# Patient Record
Sex: FEMALE
[De-identification: names, ages, dates, MRNs, and addresses within clinical notes are randomized per-mention and may not be internally consistent; named-entity substitution may affect disease eponyms.]

---

## 2018-10-25 ENCOUNTER — HOSPITAL ENCOUNTER (EMERGENCY)
Dept: HOSPITAL 14 - H.ER | Age: 48
Discharge: HOME | End: 2018-10-25
Payer: COMMERCIAL

## 2018-10-25 VITALS
TEMPERATURE: 97.8 F | DIASTOLIC BLOOD PRESSURE: 78 MMHG | SYSTOLIC BLOOD PRESSURE: 120 MMHG | OXYGEN SATURATION: 98 % | HEART RATE: 77 BPM | RESPIRATION RATE: 16 BRPM

## 2018-10-25 DIAGNOSIS — F41.9: ICD-10-CM

## 2018-10-25 DIAGNOSIS — R00.2: Primary | ICD-10-CM

## 2018-10-25 DIAGNOSIS — Z88.0: ICD-10-CM

## 2018-10-25 LAB
ALBUMIN SERPL-MCNC: 4.3 G/DL (ref 3.5–5)
ALBUMIN/GLOB SERPL: 1.1 {RATIO} (ref 1–2.1)
ALT SERPL-CCNC: 36 U/L (ref 9–52)
AST SERPL-CCNC: 44 U/L (ref 14–36)
BASOPHILS # BLD AUTO: 0.1 K/UL (ref 0–0.2)
BASOPHILS NFR BLD: 1.1 % (ref 0–2)
BUN SERPL-MCNC: 22 MG/DL (ref 7–17)
CALCIUM SERPL-MCNC: 9.6 MG/DL (ref 8.4–10.2)
EOSINOPHIL # BLD AUTO: 0.2 K/UL (ref 0–0.7)
EOSINOPHIL NFR BLD: 2.3 % (ref 0–4)
ERYTHROCYTE [DISTWIDTH] IN BLOOD BY AUTOMATED COUNT: 12.8 % (ref 11.5–14.5)
GFR NON-AFRICAN AMERICAN: > 60
HGB BLD-MCNC: 14.6 G/DL (ref 12–16)
LYMPHOCYTES # BLD AUTO: 2.5 K/UL (ref 1–4.3)
LYMPHOCYTES NFR BLD AUTO: 37.6 % (ref 20–40)
MCH RBC QN AUTO: 32.5 PG (ref 27–31)
MCHC RBC AUTO-ENTMCNC: 34.1 G/DL (ref 33–37)
MCV RBC AUTO: 95.1 FL (ref 81–99)
MONOCYTES # BLD: 0.7 K/UL (ref 0–0.8)
MONOCYTES NFR BLD: 10 % (ref 0–10)
NEUTROPHILS # BLD: 3.3 K/UL (ref 1.8–7)
NEUTROPHILS NFR BLD AUTO: 49 % (ref 50–75)
NRBC BLD AUTO-RTO: 0.1 % (ref 0–0)
PLATELET # BLD: 253 K/UL (ref 130–400)
PMV BLD AUTO: 7.5 FL (ref 7.2–11.7)
RBC # BLD AUTO: 4.5 MIL/UL (ref 3.8–5.2)
WBC # BLD AUTO: 6.7 K/UL (ref 4.8–10.8)

## 2018-10-25 NOTE — ED PDOC
HPI: General Adult


Chief Complaint (Provider): "dizziness, palpitations, anxiety"


History Per: Patient


History/Exam Limitations: no limitations


Onset/Duration Of Symptoms: Mins


Have you had recent travel within the past 21 days to any of the following 

countries: [place], [place], [place] or [place]?: No


Additional Complaint(s): 





47 yo female with no known medical problems presents for evaluation of 

lightheadedness and palpiations. Pt begins crying in ER and states she thinks 

she is having a mental break down. PT states she lost her job and month ago and 

has been very stressed. Pt states that she has been very upset but does not want

to hurt herself. Pt states she had an episode of anxiety, lightheadedness and 

palpitations. PT states it resolved but she has never experienced anything like 

this in the past. 





<Orin Bearden - Last Filed: 10/25/18 20:11>





<Felicia Siddiqi - Last Filed: 10/25/18 21:44>


Time Seen by Provider: 10/25/18 15:33


Chief Complaint (Nursing): Dizziness/Lightheaded





Past Medical History


Reviewed: Historical Data, Nursing Documentation, Vital Signs


Vital Signs: 





                                Last Vital Signs











Temp  97.8 F   10/25/18 15:06


 


Pulse  77   10/25/18 15:06


 


Resp  16   10/25/18 15:06


 


BP  120/78   10/25/18 15:06


 


Pulse Ox  98   10/25/18 15:06














- Medical History


PMH: No Chronic Diseases





- Surgical History


Surgical History: No Surg Hx





- Family History


Family History: States: No Known Family Hx





- Living Arrangements


Living Arrangements: With Family





- Social History


Current smoker - smoking cessation education provided: No





<Orin Bearden - Last Filed: 10/25/18 20:11>


Vital Signs: 





                                Last Vital Signs











Temp  97.8 F   10/25/18 15:06


 


Pulse  77   10/25/18 15:06


 


Resp  16   10/25/18 15:06


 


BP  120/78   10/25/18 15:06


 


Pulse Ox  98   10/25/18 20:12














<Felicia Siddiqi - Last Filed: 10/25/18 21:44>





- Allergies


Allergies/Adverse Reactions: 


                                    Allergies











Allergy/AdvReac Type Severity Reaction Status Date / Time


 


aspirin Allergy  RASH Verified 10/25/18 15:06


 


Penicillins Allergy  ANAPHYLAXIS Verified 10/25/18 15:06














Review of Systems


ROS Statement: Except As Marked, All Systems Reviewed And Found Negative


Constitutional: Negative for: Fever, Chills


Cardiovascular: Positive for: Palpitations, Light Headedness.  Negative for: 

Chest Pain


Respiratory: Negative for: Cough, Shortness of Breath


Gastrointestinal: Negative for: Nausea, Vomiting, Abdominal Pain





<Orin Bearden - Last Filed: 10/25/18 20:11>





Physical Exam





- Reviewed


Nursing Documentation Reviewed: Yes


Vital Signs Reviewed: Yes





- Physical Exam


Appears: Positive for: Well, Non-toxic, No Acute Distress


Head Exam: Positive for: ATRAUMATIC, NORMAL INSPECTION, NORMOCEPHALIC


Skin: Positive for: Normal Color, Warm, DRY


Eye Exam: Positive for: Normal appearance


ENT: Positive for: Normal ENT Inspection


Neck: Positive for: Normal, Painless ROM


Cardiovascular/Chest: Positive for: Regular Rate, Rhythm


Respiratory: Positive for: Normal Breath Sounds.  Negative for: Accessory Muscle

Use, Respiratory Distress


Gastrointestinal/Abdominal: Positive for: Normal Exam, Soft.  Negative for: 

Tenderness


Back: Positive for: Normal Inspection


Extremity: Positive for: Normal ROM


Neurologic/Psych: Positive for: Alert, Oriented





<Orin Bearden - Last Filed: 10/25/18 20:11>





- Laboratory Results


Result Diagrams: 


                                 10/25/18 16:25





                                 10/25/18 16:25





- ECG


O2 Sat by Pulse Oximetry: 98


Pulse Ox Interpretation: Normal





<Orin Bearden - Last Filed: 10/25/18 20:11>





- Laboratory Results


Result Diagrams: 


                                 10/25/18 16:25





                                 10/25/18 16:25





<Felicia Siddiqi - Last Filed: 10/25/18 21:44>





Medical Decision Making


Medical Decision Making: 





Continued care by Dr. Siddiqi. Crisis evaluation pending. 


Labs normal. PT reports feeling better on re-evaluation. 





<Orin Bearden - Last Filed: 10/25/18 20:11>


Medical Decision Making: 


Patient currently pending crisis evaluation.





21:19


Patient seen and evaluated by crisis team and per Dr. Ang, stable for discharge

home.


Diagnosis: Anxiety





Scribe Attestation:


Documented by Zeina Chan, acting as a scribe for Felicia Siddiqi MD.





Provider Scribe Attestation:


All medical record entries made by the Scribe were at my direction and 

personally dictated by me. I have reviewed the chart and agree that the record 

accurately reflects my personal performance of the history, physical exam, 

medical decision making, and the department course for this patient. I have also

personally directed, reviewed, and agree with the discharge instructions and 

disposition.








<Felicia Siddiqi - Last Filed: 10/25/18 21:44>





Disposition





- Patient ED Disposition


Is Patient to be Admitted: Transfer of Care





- Disposition


Disposition: Transfer of Care


Disposition Time: 20:00





<Orin Bearden - Last Filed: 10/25/18 20:11>





- Patient ED Disposition


Is Patient to be Admitted: No


Doctor Will See Patient In The: Office


Counseled Patient/Family Regarding: Studies Performed, Diagnosis, Need For 

Followup





- Disposition


Disposition: Routine/Home


Disposition Time: 21:19





<Felicia Siddiqi - Last Filed: 10/25/18 21:44>





- Clinical Impression


Clinical Impression: 


 Anxiety, Palpitations








- Disposition


[facility]errals: 


[facility]


Condition: GOOD


Instructions:  Anxiety, Adult (DC), Palpitations (DC)

## 2018-10-26 NOTE — CARD
--------------- APPROVED REPORT --------------





Date of service: 10/25/2018



EKG Measurement

Heart [id number]

[id number]

[id number]

[id number]

[id number]



<Conclusion>

Normal sinus rhythm

Normal ECG

## 2020-11-09 ENCOUNTER — PREPPED CHART (OUTPATIENT)
Dept: URBAN - METROPOLITAN AREA CLINIC 76 | Facility: CLINIC | Age: 50
End: 2020-11-09

## 2020-11-09 PROBLEM — H25.043 POSTERIOR SUBCAPSULAR POLAR AGE-RELATED CATARACT: Noted: 2020-11-09

## 2020-11-09 PROBLEM — D31.31 CHOROIDAL NEVUS: Noted: 2020-11-09

## 2020-11-09 PROBLEM — H04.129 DRY EYE SYNDROME: Noted: 2020-11-09

## 2020-11-09 PROBLEM — H52.13 MYOPIA: Noted: 2020-11-09
